# Patient Record
Sex: FEMALE | Race: BLACK OR AFRICAN AMERICAN | NOT HISPANIC OR LATINO | URBAN - METROPOLITAN AREA
[De-identification: names, ages, dates, MRNs, and addresses within clinical notes are randomized per-mention and may not be internally consistent; named-entity substitution may affect disease eponyms.]

---

## 2022-04-04 ENCOUNTER — OFFICE (OUTPATIENT)
Dept: URBAN - METROPOLITAN AREA CLINIC 10 | Facility: CLINIC | Age: 52
End: 2022-04-04

## 2022-04-04 VITALS
HEART RATE: 120 BPM | HEIGHT: 67 IN | DIASTOLIC BLOOD PRESSURE: 85 MMHG | OXYGEN SATURATION: 98 % | SYSTOLIC BLOOD PRESSURE: 133 MMHG | WEIGHT: 214 LBS

## 2022-04-04 DIAGNOSIS — Z86.010 PERSONAL HISTORY OF COLONIC POLYPS: ICD-10-CM

## 2022-04-04 DIAGNOSIS — K92.1 MELENA: ICD-10-CM

## 2022-04-04 DIAGNOSIS — R12 HEARTBURN: ICD-10-CM

## 2022-04-04 PROCEDURE — 99204 OFFICE O/P NEW MOD 45 MIN: CPT | Performed by: INTERNAL MEDICINE

## 2022-04-04 RX ORDER — POLYETHYLENE GLYCOL 3350, SODIUM SULFATE, SODIUM CHLORIDE, POTASSIUM CHLORIDE, ASCORBIC ACID, SODIUM ASCORBATE 140-9-5.2G
KIT ORAL
Qty: 1 | Refills: 0 | Status: COMPLETED
Start: 2022-04-04 | End: 2022-05-24

## 2022-05-24 ENCOUNTER — AMBULATORY SURGICAL CENTER (OUTPATIENT)
Dept: URBAN - METROPOLITAN AREA SURGERY 1 | Facility: SURGERY | Age: 52
End: 2022-05-24

## 2022-05-24 ENCOUNTER — OFFICE (OUTPATIENT)
Dept: URBAN - METROPOLITAN AREA CLINIC 10 | Facility: CLINIC | Age: 52
End: 2022-05-24
Payer: COMMERCIAL

## 2022-05-24 ENCOUNTER — AMBULATORY SURGICAL CENTER (OUTPATIENT)
Dept: URBAN - METROPOLITAN AREA SURGERY 1 | Facility: SURGERY | Age: 52
End: 2022-05-24
Payer: COMMERCIAL

## 2022-05-24 VITALS
HEART RATE: 78 BPM | SYSTOLIC BLOOD PRESSURE: 127 MMHG | OXYGEN SATURATION: 98 % | TEMPERATURE: 97 F | TEMPERATURE: 97 F | DIASTOLIC BLOOD PRESSURE: 83 MMHG | RESPIRATION RATE: 20 BRPM | OXYGEN SATURATION: 97 % | RESPIRATION RATE: 21 BRPM | DIASTOLIC BLOOD PRESSURE: 77 MMHG | DIASTOLIC BLOOD PRESSURE: 83 MMHG | HEIGHT: 67 IN | OXYGEN SATURATION: 98 % | SYSTOLIC BLOOD PRESSURE: 134 MMHG | DIASTOLIC BLOOD PRESSURE: 85 MMHG | SYSTOLIC BLOOD PRESSURE: 119 MMHG | RESPIRATION RATE: 18 BRPM | DIASTOLIC BLOOD PRESSURE: 91 MMHG | RESPIRATION RATE: 21 BRPM | RESPIRATION RATE: 21 BRPM | OXYGEN SATURATION: 97 % | OXYGEN SATURATION: 97 % | DIASTOLIC BLOOD PRESSURE: 85 MMHG | DIASTOLIC BLOOD PRESSURE: 91 MMHG | HEART RATE: 78 BPM | DIASTOLIC BLOOD PRESSURE: 77 MMHG | SYSTOLIC BLOOD PRESSURE: 134 MMHG | HEART RATE: 79 BPM | RESPIRATION RATE: 18 BRPM | SYSTOLIC BLOOD PRESSURE: 153 MMHG | DIASTOLIC BLOOD PRESSURE: 77 MMHG | HEART RATE: 79 BPM | SYSTOLIC BLOOD PRESSURE: 119 MMHG | HEART RATE: 99 BPM | HEIGHT: 67 IN | DIASTOLIC BLOOD PRESSURE: 85 MMHG | HEART RATE: 85 BPM | SYSTOLIC BLOOD PRESSURE: 153 MMHG | OXYGEN SATURATION: 98 % | OXYGEN SATURATION: 96 % | HEART RATE: 103 BPM | OXYGEN SATURATION: 96 % | HEART RATE: 103 BPM | WEIGHT: 207 LBS | SYSTOLIC BLOOD PRESSURE: 119 MMHG | WEIGHT: 207 LBS | HEART RATE: 99 BPM | HEART RATE: 85 BPM | HEART RATE: 85 BPM | DIASTOLIC BLOOD PRESSURE: 91 MMHG | HEART RATE: 103 BPM | TEMPERATURE: 97 F | TEMPERATURE: 97.4 F | DIASTOLIC BLOOD PRESSURE: 79 MMHG | DIASTOLIC BLOOD PRESSURE: 79 MMHG | RESPIRATION RATE: 22 BRPM | OXYGEN SATURATION: 99 % | DIASTOLIC BLOOD PRESSURE: 83 MMHG | SYSTOLIC BLOOD PRESSURE: 134 MMHG | RESPIRATION RATE: 22 BRPM | WEIGHT: 207 LBS | SYSTOLIC BLOOD PRESSURE: 127 MMHG | RESPIRATION RATE: 22 BRPM | SYSTOLIC BLOOD PRESSURE: 127 MMHG | HEART RATE: 78 BPM | DIASTOLIC BLOOD PRESSURE: 79 MMHG | SYSTOLIC BLOOD PRESSURE: 153 MMHG | RESPIRATION RATE: 20 BRPM | TEMPERATURE: 97.4 F | OXYGEN SATURATION: 96 % | HEART RATE: 99 BPM | OXYGEN SATURATION: 99 % | RESPIRATION RATE: 20 BRPM | HEIGHT: 67 IN | RESPIRATION RATE: 18 BRPM | HEART RATE: 79 BPM | OXYGEN SATURATION: 99 % | TEMPERATURE: 97.4 F

## 2022-05-24 DIAGNOSIS — D12.5 BENIGN NEOPLASM OF SIGMOID COLON: ICD-10-CM

## 2022-05-24 DIAGNOSIS — R12 HEARTBURN: ICD-10-CM

## 2022-05-24 DIAGNOSIS — Z86.010 PERSONAL HISTORY OF COLONIC POLYPS: ICD-10-CM

## 2022-05-24 PROBLEM — K63.5 POLYP OF COLON: Status: ACTIVE | Noted: 2022-05-24

## 2022-05-24 PROCEDURE — 45385 COLONOSCOPY W/LESION REMOVAL: CPT | Performed by: INTERNAL MEDICINE

## 2022-05-24 PROCEDURE — 88305 TISSUE EXAM BY PATHOLOGIST: CPT | Performed by: INTERNAL MEDICINE

## 2022-05-24 PROCEDURE — 43235 EGD DIAGNOSTIC BRUSH WASH: CPT | Mod: 51,SG | Performed by: INTERNAL MEDICINE

## 2022-05-24 PROCEDURE — 45385 COLONOSCOPY W/LESION REMOVAL: CPT | Mod: SG | Performed by: INTERNAL MEDICINE

## 2022-05-24 PROCEDURE — 43235 EGD DIAGNOSTIC BRUSH WASH: CPT | Mod: 51 | Performed by: INTERNAL MEDICINE

## 2022-05-24 NOTE — SERVICENOTES
Start time: 1309
Cecum:  1312
TI intubation: no 
End time: 1322

[place] Bowel Prep Score:  8
-right colon:                           3
-transverse:                           2
-left colon:                              3

## 2022-05-24 NOTE — SERVICEHPINOTES
51-year-old for evaluation of persistent heartburn despite PPI.  Also here for surveillance of polyps in 2014 with instructions to repeat in 2019.

## 2022-05-24 NOTE — SERVICENOTES
Start time: 1309
Cecum:  1312
TI intubation: no 
End time: 1322

[place] Bowel Prep Score:  8
-right colon:                           3
-transverse:                           2
-left colon:                              3

## 2022-05-24 NOTE — SERVICENOTES
Start time: 1309
Cecum:  1312
TI intubation: no 
End time: 1322

[place] Bowel Prep Score:  8
-right colon:                           3
-transverse:                           2
-left colon:                              3

## 2022-06-02 ENCOUNTER — OFFICE (OUTPATIENT)
Dept: URBAN - METROPOLITAN AREA CLINIC 10 | Facility: CLINIC | Age: 52
End: 2022-06-02

## 2022-06-02 VITALS — WEIGHT: 207 LBS | HEIGHT: 67 IN

## 2022-06-02 DIAGNOSIS — R12 HEARTBURN: ICD-10-CM

## 2022-06-02 PROCEDURE — 91035 G-ESOPH REFLX TST W/ELECTROD: CPT | Performed by: INTERNAL MEDICINE

## 2022-06-02 NOTE — SERVICENOTES
Exam done off medications.
Study was supposed be done for 96 hours but appears was done for 48 HR
-A total of 20 acid reflux events were detected with 15 on day 2 and 5 on day 1. 
-The overall DeMeester score was 7 (4.4 day 1 and 8.6 on day 2)
-patient reported 2 occurrences of heartburn and 2 occurrences of regurgitation.
-symptom index for regurgitation was 0 and symptom index for heartburn was 0

This study is not consistent with symptomatic acid reflux

## 2023-09-30 PROBLEM — M54.50 LOWER BACK PAIN: Status: ACTIVE | Noted: 2023-09-30

## 2023-09-30 PROBLEM — O09.529 MULTIGRAVIDA OF ADVANCED MATERNAL AGE (HHS-HCC): Status: ACTIVE | Noted: 2023-09-30

## 2023-09-30 PROBLEM — N94.6 DYSMENORRHEA: Status: ACTIVE | Noted: 2023-09-30

## 2023-09-30 PROBLEM — O40.3XX1: Status: ACTIVE | Noted: 2023-09-30

## 2023-09-30 PROBLEM — M54.16 BILATERAL LUMBAR RADICULOPATHY: Status: ACTIVE | Noted: 2023-09-30

## 2023-09-30 PROBLEM — B37.31 YEAST VAGINITIS: Status: ACTIVE | Noted: 2023-09-30

## 2023-09-30 PROBLEM — O26.21: Status: ACTIVE | Noted: 2023-09-30

## 2023-09-30 PROBLEM — H53.9 VISUAL CHANGES: Status: ACTIVE | Noted: 2023-09-30

## 2023-09-30 PROBLEM — O99.210 OBESITY IN PREGNANCY, ANTEPARTUM (HHS-HCC): Status: ACTIVE | Noted: 2023-09-30

## 2023-09-30 PROBLEM — R26.2 INABILITY TO AMBULATE DUE TO MULTIPLE JOINTS: Status: ACTIVE | Noted: 2023-09-30

## 2023-09-30 PROBLEM — R20.0 NUMBNESS OF LEG: Status: ACTIVE | Noted: 2023-09-30

## 2023-09-30 PROBLEM — N93.9 ABNORMAL UTERINE BLEEDING (AUB): Status: ACTIVE | Noted: 2023-09-30

## 2023-09-30 PROBLEM — N85.2 ENLARGED UTERUS: Status: ACTIVE | Noted: 2023-09-30

## 2023-09-30 PROBLEM — H00.016 HORDEOLUM EXTERNUM OF LEFT EYE: Status: ACTIVE | Noted: 2023-09-30

## 2023-09-30 PROBLEM — R23.8 SKIN IRRITATION: Status: ACTIVE | Noted: 2023-09-30

## 2023-09-30 RX ORDER — HYDROCHLOROTHIAZIDE 25 MG/1
1 TABLET ORAL DAILY
COMMUNITY
Start: 2017-09-30

## 2023-10-27 ENCOUNTER — APPOINTMENT (OUTPATIENT)
Dept: PRIMARY CARE | Facility: CLINIC | Age: 53
End: 2023-10-27
Payer: COMMERCIAL

## 2023-11-07 ENCOUNTER — APPOINTMENT (OUTPATIENT)
Dept: OBSTETRICS AND GYNECOLOGY | Facility: HOSPITAL | Age: 53
End: 2023-11-07
Payer: COMMERCIAL

## 2023-12-15 ENCOUNTER — OFFICE VISIT (OUTPATIENT)
Dept: DERMATOLOGY | Facility: CLINIC | Age: 53
End: 2023-12-15
Payer: COMMERCIAL

## 2023-12-15 DIAGNOSIS — L91.0 KELOID: Primary | ICD-10-CM

## 2023-12-15 PROCEDURE — 11900 INJECT SKIN LESIONS </W 7: CPT

## 2023-12-15 PROCEDURE — 99203 OFFICE O/P NEW LOW 30 MIN: CPT | Performed by: DERMATOLOGY

## 2023-12-15 RX ORDER — BENZOYL PEROXIDE 50 MG/ML
1 LIQUID TOPICAL DAILY
Qty: 240 G | Refills: 11 | Status: SHIPPED | OUTPATIENT
Start: 2023-12-15 | End: 2024-12-14

## 2023-12-15 NOTE — PROGRESS NOTES
Subjective     Christy Bean is a 53 y.o. female who presents for the following: Suspicious Skin Lesion (Bump under chin - for many years. Tried retinal cream. Getting worse. Painful and itchy. /Biopsy done many years ago. ).   Hair and dry skin underneath the skin. Has to pick hair out from underneath the skin. It gets pus on it and drains then breaks through. She thinks it is expanding to more superior parts of her face. Endorses itch. Endorses picking in her sleep.  Hair removal: tweezers.     Also has questions on skin darkening with age.        Review of Systems:  No other skin or systemic complaints other than what is documented elsewhere in the note.    The following portions of the chart were reviewed this encounter and updated as appropriate:          Skin Cancer History  No skin cancer on file.      Specialty Problems          Dermatology Problems    Skin irritation        Objective   Well appearing patient in no apparent distress; mood and affect are within normal limits.    A focused skin examination was performed. All findings within normal limits unless otherwise noted below.    Assessment/Plan   1. Keloid  Right Submandibular Area  Shiny, thick, fibrotic pink-brown plaque on right lower jawline    Hyperpigmented keloid  - likely related to trauma from picking with tweezers and fingers  - recommend BPO 5% wash daily for facial bumps  - recommended strict sun protecton to help with hypopigmentaiton  - recommended ILK 5 inection for keloid, discussed r/b/a including atrophy and skin hypopigmentation, patient agreeable today  - can consider re-injection in 4-6 weeks    Intralesional injection - Right Submandibular Area  Intralesional Injection:   Consent:     Consent obtained:  Verbal    Consent given by:  Patient    Risks discussed:  Poor cosmetic result, pain, infection and bleeding    Alternatives discussed:  No treatment  Pre-Procedure Details:   Timeout:  patient name, date of birth, surgical site,  and procedure verified    Prep Type:  Isopropyl alcohol  Procedure Details:   Injection:  Triamcinolone  Post-procedure details: sterile dressing applied and wound care instructions given  Dressing type: bandage   Comments:  ILK 5 mg/mL  Lot: [id number]  Exp: 12/2025  Total volume: 0.6 cc      Seen and discussed with attending physician Dr. Radha Greco MD, PhD  Resident, Dermatology    I saw and evaluated the patient, participating in the key elements of the service.  I discussed the findings, assessment and plan with the resident and agree with resident’s findings and plan as documented in the resident's note.  I was immediately available for the entirety of the procedure(s) and present for the key and critical portions.     Guevara Garcia MD PhD

## 2024-02-02 ENCOUNTER — APPOINTMENT (OUTPATIENT)
Dept: DERMATOLOGY | Facility: CLINIC | Age: 54
End: 2024-02-02
Payer: COMMERCIAL

## 2024-03-08 ENCOUNTER — OFFICE VISIT (OUTPATIENT)
Dept: DERMATOLOGY | Facility: CLINIC | Age: 54
End: 2024-03-08
Payer: COMMERCIAL

## 2024-03-08 DIAGNOSIS — L91.0 KELOID: Primary | ICD-10-CM

## 2024-03-08 PROCEDURE — 11900 INJECT SKIN LESIONS </W 7: CPT | Performed by: STUDENT IN AN ORGANIZED HEALTH CARE EDUCATION/TRAINING PROGRAM

## 2024-03-08 NOTE — PROGRESS NOTES
Subjective     Christy Bean is a 53 y.o. female who presents for the following: Keloid (Improved since last visit - still having some issues with ingrown hairs).     Follow up for keloid on the right chin, at last visit treated with intralesional kenalog. Reports significant reduction in size since last visit. Symptoms of pain and itch. Denies appearance of new keloids. Last treatment was 3 months ago. Presents for treatment #2.    Review of Systems:  No other skin or systemic complaints other than what is documented elsewhere in the note.    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    Past Surgical History:  Christy Bean  has no past surgical history on file.    Family History:  Patient family history is not on file.    Social History:  Christy Bean  has no history on file for tobacco use, alcohol use, and drug use.    Allergies:  Aspirin-acetaminophen-caffeine and Bee venom protein (honey bee)    Current Medications / CAM's:    Current Outpatient Medications:     benzoyl peroxide 5 % external wash, Apply 1 Application topically once daily. Dispense 240 mL. Use as a facial wash and let sit for 5 min before rinsing, Disp: 240 g, Rfl: 11    hydroCHLOROthiazide (HYDRODiuril) 25 mg tablet, Take 1 tablet (25 mg) by mouth once daily., Disp: , Rfl:      Objective   Well appearing patient in no apparent distress; mood and affect are within normal limits.    A focused examination was  performed including face. All findings within normal limits unless otherwise noted below.      Right Anterior Mandible  Shiny, thick, fibrotic pink-brown plaque.            Assessment/Plan   Keloid  Right Anterior Mandible    Keloid x 1 on the right chin  - Improving s/p 1 treatment with ILK 5 mg/ml in December 2023  - likely related to trauma from picking with tweezers and fingers  - Continue BPO 5% wash daily for facial bumps  - Continue strict sun protecton to help with hypopigmentaiton  - Treatment #2  ILK 5 inection for keloid, discussed r/b/a including atrophy and skin hypopigmentation, patient agreeable today  - can consider re-injection in 4-6 weeks    Intralesional injection - Right Anterior Mandible  Intralesional Injection:   Consent:     Consent obtained:  Verbal    Consent given by:  Patient    Risks discussed:  Poor cosmetic result, pain, infection and bleeding    Alternatives discussed:  No treatment  Pre-Procedure Details:     Prep Type:  Isopropyl alcohol  Procedure Details:   Injection:  Triamcinolone  Outcome: patient tolerated procedure well  Post-procedure details: sterile dressing applied and wound care instructions given  Dressing type: bandage   Comments:  A total of 0.6 ml of 5 mg/mL Kenalog were injected into 1 lesion(s)    Related Procedures  Follow Up In Dermatology - Established Patient    Related Medications  benzoyl peroxide 5 % external wash  Apply 1 Application topically once daily. Dispense 240 mL. Use as a facial wash and let sit for 5 min before rinsing         Asmita Alvarez MD   PGY-4 Dermatology Resident    I saw and evaluated the patient, participating in the key elements of the service.  I discussed the findings, assessment and plan with the resident and agree with resident’s findings and plan as documented in the resident's note.  I was immediately available for the entirety of the procedure(s) and present for the key and critical portions.     Guevara Garcia MD PhD

## 2024-04-19 ENCOUNTER — APPOINTMENT (OUTPATIENT)
Dept: DERMATOLOGY | Facility: CLINIC | Age: 54
End: 2024-04-19
Payer: COMMERCIAL

## 2024-05-03 ENCOUNTER — APPOINTMENT (OUTPATIENT)
Dept: DERMATOLOGY | Facility: CLINIC | Age: 54
End: 2024-05-03
Payer: COMMERCIAL

## 2024-06-28 ENCOUNTER — APPOINTMENT (OUTPATIENT)
Dept: DERMATOLOGY | Facility: CLINIC | Age: 54
End: 2024-06-28
Payer: COMMERCIAL

## 2024-08-05 ENCOUNTER — APPOINTMENT (OUTPATIENT)
Dept: DERMATOLOGY | Facility: CLINIC | Age: 54
End: 2024-08-05
Payer: COMMERCIAL

## 2024-08-05 ENCOUNTER — TELEPHONE (OUTPATIENT)
Dept: DERMATOLOGY | Facility: CLINIC | Age: 54
End: 2024-08-05

## 2024-09-09 ENCOUNTER — APPOINTMENT (OUTPATIENT)
Dept: DERMATOLOGY | Facility: CLINIC | Age: 54
End: 2024-09-09
Payer: COMMERCIAL

## 2024-11-04 ENCOUNTER — APPOINTMENT (OUTPATIENT)
Dept: DERMATOLOGY | Facility: CLINIC | Age: 54
End: 2024-11-04
Payer: COMMERCIAL

## 2024-11-04 DIAGNOSIS — L91.0 KELOID: Primary | ICD-10-CM

## 2024-11-04 DIAGNOSIS — R20.8 SKIN PAIN: ICD-10-CM

## 2024-11-04 PROCEDURE — 11900 INJECT SKIN LESIONS </W 7: CPT

## 2024-11-04 PROCEDURE — 99213 OFFICE O/P EST LOW 20 MIN: CPT | Performed by: DERMATOLOGY

## 2024-11-04 RX ORDER — BENZOYL PEROXIDE 50 MG/ML
1 LIQUID TOPICAL DAILY
Qty: 240 G | Refills: 11 | Status: SHIPPED | OUTPATIENT
Start: 2024-11-04 | End: 2025-11-04

## 2024-11-04 ASSESSMENT — DERMATOLOGY QUALITY OF LIFE (QOL) ASSESSMENT
RATE HOW BOTHERED YOU ARE BY SYMPTOMS OF YOUR SKIN PROBLEM (EG, ITCHING, STINGING BURNING, HURTING OR SKIN IRRITATION): 3
ARE THERE EXCLUSIONS OR EXCEPTIONS FOR THE QUALITY OF LIFE ASSESSMENT: NO
DATE THE QUALITY-OF-LIFE ASSESSMENT WAS COMPLETED: 67148
RATE HOW EMOTIONALLY BOTHERED YOU ARE BY YOUR SKIN PROBLEM (FOR EXAMPLE, WORRY, EMBARRASSMENT, FRUSTRATION): 0 - NEVER BOTHERED
RATE HOW BOTHERED YOU ARE BY EFFECTS OF YOUR SKIN PROBLEMS ON YOUR ACTIVITIES (EG, GOING OUT, ACCOMPLISHING WHAT YOU WANT, WORK ACTIVITIES OR YOUR RELATIONSHIPS WITH OTHERS): 0 - NEVER BOTHERED

## 2024-11-04 ASSESSMENT — DERMATOLOGY PATIENT ASSESSMENT
ARE YOU TRYING TO GET PREGNANT: NO
DO YOU HAVE IRREGULAR MENSTRUAL CYCLES: NO
DO YOU USE A TANNING BED: NO
ARE YOU AN ORGAN TRANSPLANT RECIPIENT: NO
HAVE YOU HAD OR DO YOU HAVE VASCULAR DISEASE: NO
HAVE YOU HAD OR DO YOU HAVE A STAPH INFECTION: NO
DO YOU HAVE ANY NEW OR CHANGING LESIONS: NO
ARE YOU ON BIRTH CONTROL: NO

## 2024-11-04 ASSESSMENT — ITCH NUMERIC RATING SCALE: HOW SEVERE IS YOUR ITCHING?: 10

## 2024-11-04 ASSESSMENT — PATIENT GLOBAL ASSESSMENT (PGA): PATIENT GLOBAL ASSESSMENT: PATIENT GLOBAL ASSESSMENT:  1 - CLEAR

## 2024-11-04 NOTE — PROGRESS NOTES
Susan Bean is a 54 y.o. female who presents for the following: Suspicious Skin Lesion (Follow up).     Patient was last seen on 3/8/24 at which time she underwent her second treatment of ILK to keloid on the right chin. She was continued on benzoyl peroxide wash as well. She reports that it has helped with decreasing size and itching. She has continued to pluck ingrown hairs at the spot daily. Reports that it will occasionally get irritated because of ingrown hairs and drain purulence. Today, she is interested in repeating ILK.    Treatment history  - ILK 5 12/2023  - ILK 5 3/2024    Review of Systems:  No other skin or systemic complaints other than what is documented elsewhere in the note.    The following portions of the chart were reviewed this encounter and updated as appropriate:          Skin Cancer History  No skin cancer on file.      Specialty Problems          Dermatology Problems    Skin irritation        Objective   Well appearing patient in no apparent distress; mood and affect are within normal limits.    A focused skin examination was performed. All findings within normal limits unless otherwise noted below.    Assessment/Plan   1. Keloid  Right Anterior Mandible  Fibrotic pink-brown plaque with small area of hypopigmentation on the right chin, improved from prior    Keloid x 1 on the right chin  - Improving s/p 2 treatment with ILK 5 mg/ml in December 2023 and 3/2024  - Likely related to trauma from picking with tweezers and fingers  - Continue BPO 5% wash daily for facial bumps  - Continue strict sun protecton to help with hypopigmentaiton  - Treatment #3 ILK 5 inection for keloid, discussed r/b/a including atrophy and skin hypopigmentation, patient agreeable today  - can consider re-injection in 4-6 weeks    Intralesional injection - Right Anterior Mandible  Intralesional Injection:   Consent:     Consent obtained:  Verbal    Consent given by:  Patient    Risks discussed:  Poor  cosmetic result, pain, infection and bleeding    Alternatives discussed:  No treatment  Pre-Procedure Details:     Prep Type:  Isopropyl alcohol  Procedure Details:   Injection:  Triamcinolone  Outcome: patient tolerated procedure well  Post-procedure details: sterile dressing applied and wound care instructions given  Dressing type: bandage   Comments:  A total of 0.5 ml of 5 mg/mL Kenalog were injected into 1 lesion(s)  Lot #: [id number]  Expiration: 4/2026    triamcinolone acetonide (Kenalog) injection 2.5 mg - Right Anterior Mandible      Related Procedures  Follow Up In Dermatology - Established Patient    Related Medications  benzoyl peroxide 5 % external wash  Apply 1 Application topically once daily. Dispense 240 mL. Use as a facial wash and let sit for 5 min before rinsing      Ana Yu MD  PGY-2, Dermatology     I saw and evaluated the patient, participating in the key elements of the service.  I discussed the findings, assessment and plan with the resident and agree with resident’s findings and plan as documented in the resident's note.  I was immediately available for the entirety of the procedure(s) and present for the key and critical portions.     Guevara Garcia MD PhD

## 2024-12-12 ENCOUNTER — APPOINTMENT (OUTPATIENT)
Dept: DERMATOLOGY | Facility: CLINIC | Age: 54
End: 2024-12-12
Payer: COMMERCIAL